# Patient Record
Sex: MALE | URBAN - METROPOLITAN AREA
[De-identification: names, ages, dates, MRNs, and addresses within clinical notes are randomized per-mention and may not be internally consistent; named-entity substitution may affect disease eponyms.]

---

## 2023-02-06 PROBLEM — E66.01 SEVERE OBESITY WITH BODY MASS INDEX (BMI) OF 35.0 TO 39.9 WITH SERIOUS COMORBIDITY (MULTI): Status: RESOLVED | Noted: 2023-02-06 | Resolved: 2023-02-06

## 2023-02-06 PROBLEM — E78.2 MIXED DYSLIPIDEMIA: Status: ACTIVE | Noted: 2023-02-06

## 2023-02-06 PROBLEM — G47.33 MODERATE OBSTRUCTIVE SLEEP APNEA: Status: ACTIVE | Noted: 2023-02-06

## 2023-02-06 PROBLEM — F41.9 ANXIETY AND DEPRESSION: Status: ACTIVE | Noted: 2023-02-06

## 2023-02-06 PROBLEM — I10 ESSENTIAL HYPERTENSION: Status: ACTIVE | Noted: 2023-02-06

## 2023-02-06 PROBLEM — F32.A ANXIETY AND DEPRESSION: Status: ACTIVE | Noted: 2023-02-06

## 2023-02-06 RX ORDER — BUPROPION HYDROCHLORIDE 150 MG/1
1 TABLET ORAL DAILY
COMMUNITY
Start: 2022-01-31 | End: 2023-04-10 | Stop reason: SDUPTHER

## 2023-02-06 RX ORDER — LOSARTAN POTASSIUM 100 MG/1
1 TABLET ORAL DAILY
COMMUNITY
Start: 2021-10-07 | End: 2023-04-10 | Stop reason: SDUPTHER

## 2023-02-06 RX ORDER — ESCITALOPRAM OXALATE 10 MG/1
1 TABLET ORAL DAILY
COMMUNITY
Start: 2021-10-07 | End: 2023-04-10 | Stop reason: SDUPTHER

## 2023-02-06 RX ORDER — AMLODIPINE BESYLATE 5 MG/1
5 TABLET ORAL DAILY
COMMUNITY
End: 2023-03-17 | Stop reason: DRUGHIGH

## 2023-03-17 ENCOUNTER — LAB (OUTPATIENT)
Dept: LAB | Facility: LAB | Age: 33
End: 2023-03-17
Payer: COMMERCIAL

## 2023-03-17 ENCOUNTER — OFFICE VISIT (OUTPATIENT)
Dept: PRIMARY CARE | Facility: CLINIC | Age: 33
End: 2023-03-17
Payer: COMMERCIAL

## 2023-03-17 VITALS
WEIGHT: 291 LBS | HEART RATE: 94 BPM | TEMPERATURE: 97.9 F | OXYGEN SATURATION: 96 % | SYSTOLIC BLOOD PRESSURE: 148 MMHG | BODY MASS INDEX: 35.44 KG/M2 | RESPIRATION RATE: 16 BRPM | HEIGHT: 76 IN | DIASTOLIC BLOOD PRESSURE: 90 MMHG

## 2023-03-17 DIAGNOSIS — Z00.00 ANNUAL PHYSICAL EXAM: Primary | ICD-10-CM

## 2023-03-17 DIAGNOSIS — F41.9 ANXIETY AND DEPRESSION: ICD-10-CM

## 2023-03-17 DIAGNOSIS — E66.01 SEVERE OBESITY (BMI 35.0-35.9 WITH COMORBIDITY) (MULTI): ICD-10-CM

## 2023-03-17 DIAGNOSIS — E78.2 MIXED DYSLIPIDEMIA: ICD-10-CM

## 2023-03-17 DIAGNOSIS — I10 ESSENTIAL HYPERTENSION: ICD-10-CM

## 2023-03-17 DIAGNOSIS — F32.A ANXIETY AND DEPRESSION: ICD-10-CM

## 2023-03-17 LAB
ALANINE AMINOTRANSFERASE (SGPT) (U/L) IN SER/PLAS: 43 U/L (ref 10–52)
ANION GAP IN SER/PLAS: 12 MMOL/L (ref 10–20)
APPEARANCE, URINE: CLEAR
ASPARTATE AMINOTRANSFERASE (SGOT) (U/L) IN SER/PLAS: 19 U/L (ref 9–39)
BILIRUBIN, URINE: NEGATIVE
BLOOD, URINE: NEGATIVE
CALCIUM (MG/DL) IN SER/PLAS: 10.1 MG/DL (ref 8.6–10.6)
CARBON DIOXIDE, TOTAL (MMOL/L) IN SER/PLAS: 25 MMOL/L (ref 21–32)
CHLORIDE (MMOL/L) IN SER/PLAS: 108 MMOL/L (ref 98–107)
CHOLESTEROL (MG/DL) IN SER/PLAS: 161 MG/DL (ref 0–199)
CHOLESTEROL IN HDL (MG/DL) IN SER/PLAS: 42.4 MG/DL
CHOLESTEROL/HDL RATIO: 3.8
COLOR, URINE: YELLOW
CREATININE (MG/DL) IN SER/PLAS: 0.92 MG/DL (ref 0.5–1.3)
ERYTHROCYTE DISTRIBUTION WIDTH (RATIO) BY AUTOMATED COUNT: 12.6 % (ref 11.5–14.5)
ERYTHROCYTE MEAN CORPUSCULAR HEMOGLOBIN CONCENTRATION (G/DL) BY AUTOMATED: 32.9 G/DL (ref 32–36)
ERYTHROCYTE MEAN CORPUSCULAR VOLUME (FL) BY AUTOMATED COUNT: 96 FL (ref 80–100)
ERYTHROCYTES (10*6/UL) IN BLOOD BY AUTOMATED COUNT: 4.76 X10E12/L (ref 4.5–5.9)
GFR MALE: >90 ML/MIN/1.73M2
GLUCOSE (MG/DL) IN SER/PLAS: 101 MG/DL (ref 74–99)
GLUCOSE, URINE: NEGATIVE MG/DL
HEMATOCRIT (%) IN BLOOD BY AUTOMATED COUNT: 45.6 % (ref 41–52)
HEMOGLOBIN (G/DL) IN BLOOD: 15 G/DL (ref 13.5–17.5)
KETONES, URINE: NEGATIVE MG/DL
LDL: 94 MG/DL (ref 0–99)
LEUKOCYTE ESTERASE, URINE: NEGATIVE
LEUKOCYTES (10*3/UL) IN BLOOD BY AUTOMATED COUNT: 7 X10E9/L (ref 4.4–11.3)
NITRITE, URINE: NEGATIVE
NRBC (PER 100 WBCS) BY AUTOMATED COUNT: 0 /100 WBC (ref 0–0)
PH, URINE: 7 (ref 5–8)
PLATELETS (10*3/UL) IN BLOOD AUTOMATED COUNT: 353 X10E9/L (ref 150–450)
POTASSIUM (MMOL/L) IN SER/PLAS: 4.4 MMOL/L (ref 3.5–5.3)
PROTEIN, URINE: NEGATIVE MG/DL
SODIUM (MMOL/L) IN SER/PLAS: 141 MMOL/L (ref 136–145)
SPECIFIC GRAVITY, URINE: 1.02 (ref 1–1.03)
TRIGLYCERIDE (MG/DL) IN SER/PLAS: 125 MG/DL (ref 0–149)
UREA NITROGEN (MG/DL) IN SER/PLAS: 9 MG/DL (ref 6–23)
UROBILINOGEN, URINE: 2 MG/DL (ref 0–1.9)
VLDL: 25 MG/DL (ref 0–40)

## 2023-03-17 PROCEDURE — 84450 TRANSFERASE (AST) (SGOT): CPT

## 2023-03-17 PROCEDURE — 81003 URINALYSIS AUTO W/O SCOPE: CPT

## 2023-03-17 PROCEDURE — 3008F BODY MASS INDEX DOCD: CPT | Performed by: FAMILY MEDICINE

## 2023-03-17 PROCEDURE — 99395 PREV VISIT EST AGE 18-39: CPT | Performed by: FAMILY MEDICINE

## 2023-03-17 PROCEDURE — 80048 BASIC METABOLIC PNL TOTAL CA: CPT

## 2023-03-17 PROCEDURE — 3080F DIAST BP >= 90 MM HG: CPT | Performed by: FAMILY MEDICINE

## 2023-03-17 PROCEDURE — 36415 COLL VENOUS BLD VENIPUNCTURE: CPT

## 2023-03-17 PROCEDURE — 3077F SYST BP >= 140 MM HG: CPT | Performed by: FAMILY MEDICINE

## 2023-03-17 PROCEDURE — 85027 COMPLETE CBC AUTOMATED: CPT

## 2023-03-17 PROCEDURE — 80061 LIPID PANEL: CPT

## 2023-03-17 PROCEDURE — 84460 ALANINE AMINO (ALT) (SGPT): CPT

## 2023-03-17 PROCEDURE — 1036F TOBACCO NON-USER: CPT | Performed by: FAMILY MEDICINE

## 2023-03-17 RX ORDER — AMLODIPINE BESYLATE 5 MG/1
10 TABLET ORAL DAILY
Qty: 1 TABLET | Refills: 0 | Status: SHIPPED | OUTPATIENT
Start: 2023-03-17 | End: 2023-04-10 | Stop reason: SDUPTHER

## 2023-03-17 ASSESSMENT — PAIN SCALES - GENERAL: PAINLEVEL: 0-NO PAIN

## 2023-03-17 ASSESSMENT — PATIENT HEALTH QUESTIONNAIRE - PHQ9
SUM OF ALL RESPONSES TO PHQ9 QUESTIONS 1 AND 2: 0
2. FEELING DOWN, DEPRESSED OR HOPELESS: NOT AT ALL
1. LITTLE INTEREST OR PLEASURE IN DOING THINGS: NOT AT ALL

## 2023-03-17 NOTE — PATIENT INSTRUCTIONS
Fasting labs.  Increase Norvasc (Amlodipine) to 2 tabs each morning.  Continue other meds at current doses.    Hypertension recommendations:  DASH diet.  Decrease sodium intake to <1,500 mg/day.  Recommend weight loss efforts (see www.yourweightmatters.org/category/nutrition for ideas).  Recommend exercising (brisk walking, jogging, swimming, bicycling) 30 minutes/day, 5 days/week.  Stop smoking (if applicable).  Call Tobacco Quit Line ((4-462-QUIT-NOW) for resources and support.  Decrease alcohol intake (if applicable).     F/U 2-3 weeks: BP check, Review lab results.

## 2023-03-17 NOTE — PROGRESS NOTES
"Subjective   Patient ID: Jhon Izaguirre is a 33 y.o. male who presents for Annual Exam (CPE).    HPI   Patient's health is described as fair.  Regular dental visits: Yes.  Vision problems: No.  Corrective lenses: No.  Last eye exam within 1 year: No.  Hearing loss: No.  Requests audiology referral: No.  Immunizations up to date: Yes.  Healthy diet: Yes.  Regular exercise:  No. Trying to lose weight: No.  Sexually active: Yes.  Using contraception: No.  Requests STD screening: No.  Colon cancer screening up to date: N/A.    H/O HTN.  BP elevated at last visit.  No med changes at that time.  Taking med(s) as directed.  Does not need refills at this time.    H/O Anxiety, Depression.  Condition(s) stable.  Taking med(s) as directed.  Does not need refills at this time.    Review of Systems   All other systems reviewed and are negative.  Objective   /90   Pulse 94   Temp 36.6 °C (97.9 °F)   Resp 16   Ht 1.93 m (6' 4\")   Wt 132 kg (291 lb)   SpO2 96%   BMI 35.42 kg/m²   Physical Exam  Constitutional:       General: He is not in acute distress.     Appearance: He is obese.   HENT:      Head: Normocephalic.      Right Ear: Tympanic membrane normal.      Left Ear: Tympanic membrane normal.      Nose: Nose normal.      Mouth/Throat:      Pharynx: Oropharynx is clear. No oropharyngeal exudate or posterior oropharyngeal erythema.   Eyes:      Extraocular Movements: Extraocular movements intact.      Conjunctiva/sclera: Conjunctivae normal.      Pupils: Pupils are equal, round, and reactive to light.   Neck:      Vascular: No carotid bruit.   Cardiovascular:      Rate and Rhythm: Normal rate and regular rhythm.      Heart sounds: No murmur heard.     No friction rub. No gallop.   Pulmonary:      Effort: Pulmonary effort is normal.      Breath sounds: Normal breath sounds. No wheezing, rhonchi or rales.   Abdominal:      General: Bowel sounds are normal. There is no distension.      Palpations: Abdomen is soft. " There is no mass.      Tenderness: There is no abdominal tenderness. There is no guarding or rebound.   Lymphadenopathy:      Cervical: No cervical adenopathy.   Skin:     Coloration: Skin is not jaundiced or pale.   Neurological:      General: No focal deficit present.      Mental Status: He is oriented to person, place, and time.   Psychiatric:         Mood and Affect: Mood normal.         Behavior: Behavior normal.     Assessment/Plan   Diagnoses and all orders for this visit:  Annual physical exam  Essential hypertension  -     CBC; Future  -     Basic Metabolic Panel; Future  -     Urinalysis with Reflex Microscopic; Future  Mixed dyslipidemia  -     Lipid Panel; Future  -     Alanine Aminotransferase; Future  -     Aspartate Aminotransferase; Future  Anxiety and depression  Severe obesity (BMI 35.0-35.9 with comorbidity) (CMS/HCC)  Other orders  -     amLODIPine (Norvasc) 5 mg tablet; Take 2 tablets (10 mg) by mouth once daily.    Fasting labs.  Increase Norvasc (Amlodipine) to 2 tabs each morning.  Continue other meds at current doses.    Hypertension recommendations:  DASH diet.  Decrease sodium intake to <1,500 mg/day.  Recommend weight loss efforts (see www.yourweightmatters.org/category/nutrition for ideas).  Recommend exercising (brisk walking, jogging, swimming, bicycling) 30 minutes/day, 5 days/week.  Stop smoking (if applicable).  Call Tobacco Quit Line ((7-664-QUIT-NOW) for resources and support.  Decrease alcohol intake (if applicable).     F/U 2-3 weeks: BP check, Review lab results.

## 2023-04-10 ENCOUNTER — OFFICE VISIT (OUTPATIENT)
Dept: PRIMARY CARE | Facility: CLINIC | Age: 33
End: 2023-04-10
Payer: COMMERCIAL

## 2023-04-10 VITALS
HEART RATE: 88 BPM | WEIGHT: 291.4 LBS | HEIGHT: 76 IN | RESPIRATION RATE: 16 BRPM | BODY MASS INDEX: 35.49 KG/M2 | DIASTOLIC BLOOD PRESSURE: 82 MMHG | SYSTOLIC BLOOD PRESSURE: 126 MMHG | OXYGEN SATURATION: 97 % | TEMPERATURE: 97.8 F

## 2023-04-10 DIAGNOSIS — R73.09 ELEVATED GLUCOSE: ICD-10-CM

## 2023-04-10 DIAGNOSIS — I10 ESSENTIAL HYPERTENSION: ICD-10-CM

## 2023-04-10 DIAGNOSIS — E87.8 HIGH SERUM CHLORIDE: ICD-10-CM

## 2023-04-10 DIAGNOSIS — F32.A ANXIETY AND DEPRESSION: Primary | ICD-10-CM

## 2023-04-10 DIAGNOSIS — R82.2 BILIRUBIN IN URINE: ICD-10-CM

## 2023-04-10 DIAGNOSIS — F41.9 ANXIETY AND DEPRESSION: Primary | ICD-10-CM

## 2023-04-10 PROCEDURE — 1036F TOBACCO NON-USER: CPT | Performed by: FAMILY MEDICINE

## 2023-04-10 PROCEDURE — 3074F SYST BP LT 130 MM HG: CPT | Performed by: FAMILY MEDICINE

## 2023-04-10 PROCEDURE — 3079F DIAST BP 80-89 MM HG: CPT | Performed by: FAMILY MEDICINE

## 2023-04-10 PROCEDURE — 3008F BODY MASS INDEX DOCD: CPT | Performed by: FAMILY MEDICINE

## 2023-04-10 PROCEDURE — 99214 OFFICE O/P EST MOD 30 MIN: CPT | Performed by: FAMILY MEDICINE

## 2023-04-10 RX ORDER — ESCITALOPRAM OXALATE 10 MG/1
10 TABLET ORAL EVERY MORNING
Qty: 90 TABLET | Refills: 1 | Status: SHIPPED | OUTPATIENT
Start: 2023-04-10 | End: 2023-11-10 | Stop reason: SDUPTHER

## 2023-04-10 RX ORDER — AMLODIPINE BESYLATE 10 MG/1
10 TABLET ORAL DAILY
Qty: 90 TABLET | Refills: 1 | Status: SHIPPED | OUTPATIENT
Start: 2023-04-10 | End: 2023-10-12 | Stop reason: SDUPTHER

## 2023-04-10 RX ORDER — BUPROPION HYDROCHLORIDE 150 MG/1
150 TABLET ORAL EVERY MORNING
Qty: 90 TABLET | Refills: 1 | Status: SHIPPED | OUTPATIENT
Start: 2023-04-10 | End: 2023-11-10 | Stop reason: SDUPTHER

## 2023-04-10 RX ORDER — LOSARTAN POTASSIUM 100 MG/1
100 TABLET ORAL DAILY
Qty: 90 TABLET | Refills: 1 | Status: SHIPPED | OUTPATIENT
Start: 2023-04-10 | End: 2023-11-10 | Stop reason: SDUPTHER

## 2023-04-10 NOTE — PROGRESS NOTES
"Subjective   Patient ID: Jhon Izaguirre is a 33 y.o. male who presents for Hypertension (Bp check follow up).    HPI   H/O HTN.  Norvasc increased at last visit.  Here for BP check.  Taking med(s) as directed.  Requests refills.    H/O Anxiety, Depression.  Condition(s) stable.  Taking med(s) as directed.  Requests refills.    Labs ordered at last visit.  Here to review results.    Review of Systems   All other systems reviewed and are negative.  Objective   /82   Pulse 88   Temp 36.6 °C (97.8 °F)   Resp 16   Ht 1.93 m (6' 4\")   Wt 132 kg (291 lb 6.4 oz)   SpO2 97%   BMI 35.47 kg/m²   Physical Exam  Constitutional:       General: He is not in acute distress.     Appearance: He is obese.   Eyes:      Conjunctiva/sclera: Conjunctivae normal.   Cardiovascular:      Rate and Rhythm: Normal rate and regular rhythm.      Heart sounds: Normal heart sounds. No murmur heard.     No friction rub. No gallop.   Pulmonary:      Effort: Pulmonary effort is normal.      Breath sounds: Normal breath sounds. No wheezing, rhonchi or rales.   Neurological:      Mental Status: He is oriented to person, place, and time.   Psychiatric:         Mood and Affect: Mood normal.         Behavior: Behavior normal.     Assessment/Plan   Diagnoses and all orders for this visit:  Anxiety and depression  -     buPROPion XL (Wellbutrin XL) 150 mg 24 hr tablet; Take 1 tablet (150 mg) by mouth once daily in the morning.  -     escitalopram (Lexapro) 10 mg tablet; Take 1 tablet (10 mg) by mouth once daily in the morning.  Essential hypertension  -     amLODIPine (Norvasc) 10 mg tablet; Take 1 tablet (10 mg) by mouth once daily.  -     losartan (Cozaar) 100 mg tablet; Take 1 tablet (100 mg) by mouth once daily.    Reviewed lab results.  Nonfasting labs.  Refilled medications.    F/U 7 months: Med refills (call back or send message through [employer] if Amlodipine refill needed prior to visit).  "

## 2023-04-10 NOTE — PATIENT INSTRUCTIONS
Reviewed lab results.  Nonfasting labs.  Refilled medications.    F/U 7 months: Med refills (call back or send message through [employer] if Amlodipine refill needed prior to visit).

## 2023-10-12 DIAGNOSIS — I10 ESSENTIAL HYPERTENSION: ICD-10-CM

## 2023-10-12 RX ORDER — AMLODIPINE BESYLATE 10 MG/1
10 TABLET ORAL DAILY
Qty: 30 TABLET | Refills: 0 | Status: SHIPPED | OUTPATIENT
Start: 2023-10-12 | End: 2023-11-10 | Stop reason: SDUPTHER

## 2023-10-26 ENCOUNTER — LAB (OUTPATIENT)
Dept: LAB | Facility: LAB | Age: 33
End: 2023-10-26
Payer: COMMERCIAL

## 2023-10-26 DIAGNOSIS — R73.09 ELEVATED GLUCOSE: ICD-10-CM

## 2023-10-26 DIAGNOSIS — E87.8 HIGH SERUM CHLORIDE: ICD-10-CM

## 2023-10-26 DIAGNOSIS — R82.2 BILIRUBIN IN URINE: ICD-10-CM

## 2023-10-26 LAB
ALBUMIN SERPL BCP-MCNC: 4.6 G/DL (ref 3.4–5)
ALP SERPL-CCNC: 127 U/L (ref 33–120)
ALT SERPL W P-5'-P-CCNC: 34 U/L (ref 10–52)
ANION GAP SERPL CALC-SCNC: 14 MMOL/L (ref 10–20)
APPEARANCE UR: CLEAR
AST SERPL W P-5'-P-CCNC: 27 U/L (ref 9–39)
BILIRUB DIRECT SERPL-MCNC: 0.1 MG/DL (ref 0–0.3)
BILIRUB SERPL-MCNC: 0.8 MG/DL (ref 0–1.2)
BILIRUB UR STRIP.AUTO-MCNC: NEGATIVE MG/DL
BUN SERPL-MCNC: 8 MG/DL (ref 6–23)
CALCIUM SERPL-MCNC: 9.8 MG/DL (ref 8.6–10.6)
CHLORIDE SERPL-SCNC: 104 MMOL/L (ref 98–107)
CO2 SERPL-SCNC: 24 MMOL/L (ref 21–32)
COLOR UR: ABNORMAL
CREAT SERPL-MCNC: 1 MG/DL (ref 0.5–1.3)
EST. AVERAGE GLUCOSE BLD GHB EST-MCNC: 100 MG/DL
GFR SERPL CREATININE-BSD FRML MDRD: >90 ML/MIN/1.73M*2
GLUCOSE SERPL-MCNC: 99 MG/DL (ref 74–99)
GLUCOSE UR STRIP.AUTO-MCNC: NEGATIVE MG/DL
HBA1C MFR BLD: 5.1 %
KETONES UR STRIP.AUTO-MCNC: NEGATIVE MG/DL
LEUKOCYTE ESTERASE UR QL STRIP.AUTO: NEGATIVE
NITRITE UR QL STRIP.AUTO: NEGATIVE
PH UR STRIP.AUTO: 7 [PH]
POTASSIUM SERPL-SCNC: 4.1 MMOL/L (ref 3.5–5.3)
PROT SERPL-MCNC: 7.3 G/DL (ref 6.4–8.2)
PROT UR STRIP.AUTO-MCNC: NEGATIVE MG/DL
RBC # UR STRIP.AUTO: NEGATIVE /UL
SODIUM SERPL-SCNC: 138 MMOL/L (ref 136–145)
SP GR UR STRIP.AUTO: 1
UROBILINOGEN UR STRIP.AUTO-MCNC: <2 MG/DL

## 2023-10-26 PROCEDURE — 36415 COLL VENOUS BLD VENIPUNCTURE: CPT

## 2023-10-26 PROCEDURE — 81003 URINALYSIS AUTO W/O SCOPE: CPT

## 2023-10-26 PROCEDURE — 82248 BILIRUBIN DIRECT: CPT

## 2023-10-26 PROCEDURE — 83036 HEMOGLOBIN GLYCOSYLATED A1C: CPT

## 2023-10-26 PROCEDURE — 80053 COMPREHEN METABOLIC PANEL: CPT

## 2023-11-10 ENCOUNTER — OFFICE VISIT (OUTPATIENT)
Dept: PRIMARY CARE | Facility: CLINIC | Age: 33
End: 2023-11-10
Payer: COMMERCIAL

## 2023-11-10 VITALS
DIASTOLIC BLOOD PRESSURE: 86 MMHG | HEIGHT: 76 IN | SYSTOLIC BLOOD PRESSURE: 137 MMHG | HEART RATE: 95 BPM | OXYGEN SATURATION: 95 % | RESPIRATION RATE: 16 BRPM | WEIGHT: 287 LBS | BODY MASS INDEX: 34.95 KG/M2

## 2023-11-10 DIAGNOSIS — F41.9 ANXIETY AND DEPRESSION: ICD-10-CM

## 2023-11-10 DIAGNOSIS — I10 ESSENTIAL HYPERTENSION: Primary | ICD-10-CM

## 2023-11-10 DIAGNOSIS — Z23 NEED FOR INFLUENZA VACCINATION: ICD-10-CM

## 2023-11-10 DIAGNOSIS — F32.A ANXIETY AND DEPRESSION: ICD-10-CM

## 2023-11-10 PROCEDURE — 99214 OFFICE O/P EST MOD 30 MIN: CPT | Performed by: FAMILY MEDICINE

## 2023-11-10 PROCEDURE — 90471 IMMUNIZATION ADMIN: CPT | Performed by: FAMILY MEDICINE

## 2023-11-10 PROCEDURE — 90686 IIV4 VACC NO PRSV 0.5 ML IM: CPT | Performed by: FAMILY MEDICINE

## 2023-11-10 PROCEDURE — 3075F SYST BP GE 130 - 139MM HG: CPT | Performed by: FAMILY MEDICINE

## 2023-11-10 PROCEDURE — 1036F TOBACCO NON-USER: CPT | Performed by: FAMILY MEDICINE

## 2023-11-10 PROCEDURE — 3008F BODY MASS INDEX DOCD: CPT | Performed by: FAMILY MEDICINE

## 2023-11-10 PROCEDURE — 3079F DIAST BP 80-89 MM HG: CPT | Performed by: FAMILY MEDICINE

## 2023-11-10 RX ORDER — BUPROPION HYDROCHLORIDE 150 MG/1
150 TABLET ORAL EVERY MORNING
Qty: 90 TABLET | Refills: 1 | Status: SHIPPED | OUTPATIENT
Start: 2023-11-10

## 2023-11-10 RX ORDER — AMLODIPINE BESYLATE 10 MG/1
10 TABLET ORAL DAILY
Qty: 90 TABLET | Refills: 1 | Status: SHIPPED | OUTPATIENT
Start: 2023-11-10

## 2023-11-10 RX ORDER — LOSARTAN POTASSIUM 100 MG/1
100 TABLET ORAL DAILY
Qty: 90 TABLET | Refills: 1 | Status: SHIPPED | OUTPATIENT
Start: 2023-11-10

## 2023-11-10 RX ORDER — ESCITALOPRAM OXALATE 10 MG/1
10 TABLET ORAL EVERY MORNING
Qty: 90 TABLET | Refills: 1 | Status: SHIPPED | OUTPATIENT
Start: 2023-11-10

## 2023-11-10 NOTE — PROGRESS NOTES
"Subjective   Patient ID: Jhon Izaguirre is a 33 y.o. male who presents for Med Refill.    HPI  H/O Anxiety, Depression, HTN.  Condition(s) stable.  Taking med(s) as directed.  Requests refills.    Review of Systems  No other complaints.    Objective   /86   Pulse 95   Resp 16   Ht 1.93 m (6' 4\")   Wt 130 kg (287 lb)   SpO2 95%   BMI 34.93 kg/m²     Physical Exam  Constitutional:       General: He is not in acute distress.     Appearance: He is obese.   Cardiovascular:      Rate and Rhythm: Normal rate and regular rhythm.      Heart sounds: Normal heart sounds. No murmur heard.     No friction rub. No gallop.   Pulmonary:      Effort: Pulmonary effort is normal.      Breath sounds: Normal breath sounds. No wheezing, rhonchi or rales.   Neurological:      Mental Status: He is oriented to person, place, and time.   Psychiatric:         Mood and Affect: Mood normal.         Behavior: Behavior normal.     Assessment/Plan   Diagnoses and all orders for this visit:  Essential hypertension  -     losartan (Cozaar) 100 mg tablet; Take 1 tablet (100 mg) by mouth once daily.  -     amLODIPine (Norvasc) 10 mg tablet; Take 1 tablet (10 mg) by mouth once daily.  Anxiety and depression  -     escitalopram (Lexapro) 10 mg tablet; Take 1 tablet (10 mg) by mouth once daily in the morning.  -     buPROPion XL (Wellbutrin XL) 150 mg 24 hr tablet; Take 1 tablet (150 mg) by mouth once daily in the morning.  Need for influenza vaccination  -     Flu vaccine (IIV4) age 6 months and greater, preservative free    Reviewed lab results.  Refilled medications.  Flu shot provided.    F/U 6 months: Annual wellness visit.  "

## 2023-11-10 NOTE — PATIENT INSTRUCTIONS
Reviewed lab results.  Refilled medications.  Flu shot provided.    F/U 6 months: Annual wellness visit.   Never smoker

## 2024-04-16 ENCOUNTER — HOSPITAL ENCOUNTER (OUTPATIENT)
Dept: RADIOLOGY | Facility: EXTERNAL LOCATION | Age: 34
Discharge: HOME | End: 2024-04-16
Payer: COMMERCIAL

## 2024-04-16 DIAGNOSIS — M79.644 FINGER PAIN, RIGHT: ICD-10-CM

## 2024-04-18 ENCOUNTER — OFFICE VISIT (OUTPATIENT)
Dept: ORTHOPEDIC SURGERY | Facility: CLINIC | Age: 34
End: 2024-04-18
Payer: COMMERCIAL

## 2024-04-18 DIAGNOSIS — S62.646A CLOSED NONDISPLACED FRACTURE OF PROXIMAL PHALANX OF RIGHT LITTLE FINGER, INITIAL ENCOUNTER: ICD-10-CM

## 2024-04-18 DIAGNOSIS — D16.11 ENCHONDROMA OF FINGER OF RIGHT HAND: Primary | ICD-10-CM

## 2024-04-18 PROCEDURE — 1036F TOBACCO NON-USER: CPT | Performed by: ORTHOPAEDIC SURGERY

## 2024-04-18 PROCEDURE — 99204 OFFICE O/P NEW MOD 45 MIN: CPT | Performed by: ORTHOPAEDIC SURGERY

## 2024-04-18 NOTE — LETTER
April 19, 2024     Rafa Rincon MD  [address] [facility] [address]  [place] [place] [facility] [address]    Patient: Jhon Izaguirre   YOB: 1990   Date of Visit: 4/18/2024       Dear Dr. Rafa Rincon MD:    Thank you for referring Jhon Izaguirre to me for evaluation. Below are my notes for this consultation.  If you have questions, please do not hesitate to call me. I look forward to following your patient along with you.       Sincerely,     Artis Silva MD      CC: No Recipients  ______________________________________________________________________________________    CHIEF COMPLAINT         Right small finger pain    ASSESSMENT + PLAN    Right small finger proximal phalanx nondisplaced pathologic fracture through probable enchondroma    I pointed out the fracture and discussed the typical natural history.  As this is nearly pain-free, you may advance activity with no particular restrictions.  I did suggest bibi taping for sports or other heavy hand activities.    I pointed out the lytic lesion in the ulnar base of the proximal phalanx and the features that point to a benign diagnosis, most likely an enchondroma.  I discussed the typical natural history of that condition.  It is entirely possible that this will heal in with the fracture.  If it does not, then surgical excision of the lesion would be an option, as would simple continued observation.  I reviewed the small increased risk of refracture down the road if the lesion remains in place.    Follow-up with any additional concerns.        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  This occurred on April 15 when he was wrestling with his 5-year-old son.  The trauma was minimal.  He had pain and swelling and was seen at [facility].  X-rays were concerning for displaced fracture but a bone lesion was  also noted, prompting today's visit.  His pain is decreasing appropriately.  No numbness or tingling.  No prodromal problems in that finger.  No other active upper extremity concerns.    He is not diabetic or hypothyroid.  He does not smoke.      REVIEW OF SYSTEMS       A 30-item multi-system Review Of Systems was obtained on today's intake form.  This was reviewed with the patient and is correct.  The pertinent positives and negatives are listed above.  The form has been scanned separately into the medical record.      PHYSICAL EXAM    Constitutional:    Appears stated age. Well-developed and well-nourished overweight male in no acute distress.  Psychiatric:         Pleasant normal mood and affect. Behavior is appropriate for the situation.   Head:                   Normocephalic and atraumatic.  Eyes:                    Pupils are equal and round.  Cardiovascular:  2+ radial and ulnar pulses. Fingers well-perfused.  Respiratory:        Effort normal. No respiratory distress. Speaking in complete sentences.  Neurologic:       Alert and oriented to person, place, and time.  Skin:                Skin is intact, warm and dry.  Hematologic / Lymphatic:    No lymphedema or lymphangitis.    Extremities / Musculoskeletal:                      Skin of right small finger and hand is intact with no erythema, minimal palmar ecchymosis, and minimal swelling.  Clinically well aligned.  Full composite flexion extension with full intrinsic plus minus posture.  Normal rotation with no scissoring.  Symmetric wrist and forearm motion.  Tendons are intact by individual testing.  A little discomfort with palpation over the proximal phalanx.  Sensation intact to light touch in all distributions.  Capillary refill less than 2 seconds.      IMAGING / LABS / EMGs           X-rays right small finger from urgent care from April 16 were independently interpreted by me today.  There is an expansile lytic lesion in the ulnar base of the  proximal phalanx.  Good cortication.  Appearance is consistent with a benign enchondroma.  Joints are concentric and well preserved.  There is a hairline nondisplaced fracture through the midportion of the lesion best seen on the PA view.      Past Medical History:   Diagnosis Date   • Carpal tunnel syndrome, right upper limb 11/18/2019    Carpal tunnel syndrome of right wrist       Medication Documentation Review Audit       Reviewed by Rafa Rincon MD (Physician) on 11/10/23 at 1427      Medication Order Taking? Sig Documenting Provider Last Dose Status   amLODIPine (Norvasc) 10 mg tablet [id number]  Take 1 tablet (10 mg) by mouth once daily. Rafa Rincon MD  Active   buPROPion XL (Wellbutrin XL) 150 mg 24 hr tablet [id number]  Take 1 tablet (150 mg) by mouth once daily in the morning. Rafa Rincon MD  Active   escitalopram (Lexapro) 10 mg tablet [id number]  Take 1 tablet (10 mg) by mouth once daily in the morning. Rafa Rincon MD  Active   losartan (Cozaar) 100 mg tablet [id number]  Take 1 tablet (100 mg) by mouth once daily. Rafa Rincon MD  Active                    No Known Allergies    Social History     Socioeconomic History   • Marital status:      Spouse name: Not on file   • Number of children: Not on file   • Years of education: Not on file   • Highest education level: Not on file   Occupational History   • Not on file   Tobacco Use   • Smoking status: Never   • Smokeless tobacco: Never   Substance and Sexual Activity   • Alcohol use: Yes     Alcohol/week: 3.0 standard drinks of alcohol     Types: 3 Standard drinks or equivalent per week   • Drug use: Never   • Sexual activity: Not on file   Other Topics Concern   • Not on file   Social History Narrative   • Not on file     Social Determinants of Health     Financial Resource Strain: Not on file   Food Insecurity: Not on file   Transportation Needs: Not on file   Physical Activity: Not on file   Stress: Not on file   Social  Connections: Not on file   Intimate Partner Violence: Not on file   Housing Stability: Not on file       No past surgical history on file.      Electronically Signed      ABBI Silva MD      Orthopaedic Hand Surgery      [phone]

## 2024-04-18 NOTE — PROGRESS NOTES
CHIEF COMPLAINT         Right small finger pain    ASSESSMENT + PLAN    Right small finger proximal phalanx nondisplaced pathologic fracture through probable enchondroma    I pointed out the fracture and discussed the typical natural history.  As this is nearly pain-free, you may advance activity with no particular restrictions.  I did suggest bibi taping for sports or other heavy hand activities.    I pointed out the lytic lesion in the ulnar base of the proximal phalanx and the features that point to a benign diagnosis, most likely an enchondroma.  I discussed the typical natural history of that condition.  It is entirely possible that this will heal in with the fracture.  If it does not, then surgical excision of the lesion would be an option, as would simple continued observation.  I reviewed the small increased risk of refracture down the road if the lesion remains in place.    Follow-up with any additional concerns.        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  This occurred on April 15 when he was wrestling with his 5-year-old son.  The trauma was minimal.  He had pain and swelling and was seen at [facility].  X-rays were concerning for displaced fracture but a bone lesion was also noted, prompting today's visit.  His pain is decreasing appropriately.  No numbness or tingling.  No prodromal problems in that finger.  No other active upper extremity concerns.    He is not diabetic or hypothyroid.  He does not smoke.      REVIEW OF SYSTEMS       A 30-item multi-system Review Of Systems was obtained on today's intake form.  This was reviewed with the patient and is correct.  The pertinent positives and negatives are listed above.  The form has been scanned separately into the medical record.      PHYSICAL EXAM    Constitutional:    Appears stated age. Well-developed and  well-nourished overweight male in no acute distress.  Psychiatric:         Pleasant normal mood and affect. Behavior is appropriate for the situation.   Head:                   Normocephalic and atraumatic.  Eyes:                    Pupils are equal and round.  Cardiovascular:  2+ radial and ulnar pulses. Fingers well-perfused.  Respiratory:        Effort normal. No respiratory distress. Speaking in complete sentences.  Neurologic:       Alert and oriented to person, place, and time.  Skin:                Skin is intact, warm and dry.  Hematologic / Lymphatic:    No lymphedema or lymphangitis.    Extremities / Musculoskeletal:                      Skin of right small finger and hand is intact with no erythema, minimal palmar ecchymosis, and minimal swelling.  Clinically well aligned.  Full composite flexion extension with full intrinsic plus minus posture.  Normal rotation with no scissoring.  Symmetric wrist and forearm motion.  Tendons are intact by individual testing.  A little discomfort with palpation over the proximal phalanx.  Sensation intact to light touch in all distributions.  Capillary refill less than 2 seconds.      IMAGING / LABS / EMGs           X-rays right small finger from urgent care from April 16 were independently interpreted by me today.  There is an expansile lytic lesion in the ulnar base of the proximal phalanx.  Good cortication.  Appearance is consistent with a benign enchondroma.  Joints are concentric and well preserved.  There is a hairline nondisplaced fracture through the midportion of the lesion best seen on the PA view.      Past Medical History:   Diagnosis Date    Carpal tunnel syndrome, right upper limb 11/18/2019    Carpal tunnel syndrome of right wrist       Medication Documentation Review Audit       Reviewed by Rafa Rincon MD (Physician) on 11/10/23 at 1427      Medication Order Taking? Sig Documenting Provider Last Dose Status   amLODIPine (Norvasc) 10 mg tablet  [id number]  Take 1 tablet (10 mg) by mouth once daily. Rafa Rincon MD  Active   buPROPion XL (Wellbutrin XL) 150 mg 24 hr tablet [id number]  Take 1 tablet (150 mg) by mouth once daily in the morning. Rafa Rincon MD  Active   escitalopram (Lexapro) 10 mg tablet [id number]  Take 1 tablet (10 mg) by mouth once daily in the morning. Rafa Rincon MD  Active   losartan (Cozaar) 100 mg tablet [id number]  Take 1 tablet (100 mg) by mouth once daily. Rafa Rincon MD  Active                    No Known Allergies    Social History     Socioeconomic History    Marital status:      Spouse name: Not on file    Number of children: Not on file    Years of education: Not on file    Highest education level: Not on file   Occupational History    Not on file   Tobacco Use    Smoking status: Never    Smokeless tobacco: Never   Substance and Sexual Activity    Alcohol use: Yes     Alcohol/week: 3.0 standard drinks of alcohol     Types: 3 Standard drinks or equivalent per week    Drug use: Never    Sexual activity: Not on file   Other Topics Concern    Not on file   Social History Narrative    Not on file     Social Determinants of Health     Financial Resource Strain: Not on file   Food Insecurity: Not on file   Transportation Needs: Not on file   Physical Activity: Not on file   Stress: Not on file   Social Connections: Not on file   Intimate Partner Violence: Not on file   Housing Stability: Not on file       No past surgical history on file.      Electronically Signed      ABBI Silva MD      Orthopaedic Hand Surgery      [phone]

## 2024-04-19 PROBLEM — D16.11 ENCHONDROMA OF FINGER OF RIGHT HAND: Status: ACTIVE | Noted: 2024-04-19

## 2024-04-19 PROBLEM — S62.646A CLOSED NONDISPLACED FRACTURE OF PROXIMAL PHALANX OF RIGHT LITTLE FINGER: Status: ACTIVE | Noted: 2024-04-19

## 2024-05-03 ENCOUNTER — APPOINTMENT (OUTPATIENT)
Dept: PRIMARY CARE | Facility: CLINIC | Age: 34
End: 2024-05-03
Payer: COMMERCIAL